# Patient Record
Sex: FEMALE | Race: BLACK OR AFRICAN AMERICAN
[De-identification: names, ages, dates, MRNs, and addresses within clinical notes are randomized per-mention and may not be internally consistent; named-entity substitution may affect disease eponyms.]

---

## 2021-10-03 ENCOUNTER — HOSPITAL ENCOUNTER (EMERGENCY)
Dept: HOSPITAL 35 - ER | Age: 71
Discharge: HOME | End: 2021-10-03
Payer: COMMERCIAL

## 2021-10-03 VITALS — WEIGHT: 208.01 LBS | HEIGHT: 71 IN | BODY MASS INDEX: 29.12 KG/M2

## 2021-10-03 VITALS — SYSTOLIC BLOOD PRESSURE: 130 MMHG | DIASTOLIC BLOOD PRESSURE: 80 MMHG

## 2021-10-03 DIAGNOSIS — Z90.710: ICD-10-CM

## 2021-10-03 DIAGNOSIS — R68.84: Primary | ICD-10-CM

## 2021-10-03 DIAGNOSIS — Z79.899: ICD-10-CM

## 2021-10-03 DIAGNOSIS — Z90.49: ICD-10-CM

## 2021-10-03 DIAGNOSIS — Z88.5: ICD-10-CM

## 2021-10-03 DIAGNOSIS — I10: ICD-10-CM

## 2021-10-03 NOTE — EKG
[place] [address] [facility]
[place]  [address]
Phone:  [phone]                    ELECTROCARDIOGRAM REPORT      
_______________________________________________________________________________
 
Name:       FELISHA AVILA              Room #:                     REG MARIA INES RENNER#:      [id number]     Account #:      [id number]  
Admission:  10/03/21    Attend Phys:                          
Discharge:              Date of Birth:  [birth year]  
                                                          Report #: [id number]
   [id number]
_______________________________________________________________________________
                         [facility] ED
                                       
Test Date:    2021-10-03               Test Time:    02:32:31
Pat Name:     FELISHA AVILA         Department:   
Patient ID:   [id number]            Room:          
Gender:       F                        Technician:   [place]
:          1950               Requested By: Xavi Tavares
Order Number: [id number] MD:   Manish Spence
                                 Measurements
Intervals                              Axis          
Rate:         51                       P:            2
WA:           203                      QRS:          -7
QRSD:         95                       T:            -10
QT:           488                                    
QTc:          450                                    
                           Interpretive Statements
Sinus bradycardia
Left ventricular hypertrophy
Anterior Q waves, possibly due to LVH
Borderline T abnormalities, inferior leads
Baseline wander in lead(s) III
Compared to ECG 1998 05:56:00
Criteria for LVH now present
Electronically Signed On 10-3-2021 9:34:40 CDT by Manish Spence
[email or web address]
 
 
 
 
 
 
 
 
 
 
 
 
 
 
 
 
 
 
  <ELECTRONICALLY SIGNED>
   By: Manish Spence MD, [facility]   
  10/03/21     0934
D: 10/03/21 023                           _____________________________________
T: 10/03/21 0232                           Manish Spence MD, [facility]     /EPI